# Patient Record
Sex: MALE | Race: BLACK OR AFRICAN AMERICAN
[De-identification: names, ages, dates, MRNs, and addresses within clinical notes are randomized per-mention and may not be internally consistent; named-entity substitution may affect disease eponyms.]

---

## 2018-12-05 ENCOUNTER — HOSPITAL ENCOUNTER (EMERGENCY)
Dept: HOSPITAL 92 - ERS | Age: 33
Discharge: HOME | End: 2018-12-05
Payer: SELF-PAY

## 2018-12-05 DIAGNOSIS — F41.9: ICD-10-CM

## 2018-12-05 DIAGNOSIS — H10.9: Primary | ICD-10-CM

## 2018-12-05 DIAGNOSIS — F32.9: ICD-10-CM

## 2018-12-05 PROCEDURE — 99283 EMERGENCY DEPT VISIT LOW MDM: CPT

## 2018-12-08 ENCOUNTER — HOSPITAL ENCOUNTER (EMERGENCY)
Dept: HOSPITAL 92 - ERS | Age: 33
Discharge: HOME | End: 2018-12-08
Payer: SELF-PAY

## 2018-12-08 DIAGNOSIS — L03.213: ICD-10-CM

## 2018-12-08 DIAGNOSIS — H10.9: Primary | ICD-10-CM

## 2018-12-08 PROCEDURE — 96372 THER/PROPH/DIAG INJ SC/IM: CPT

## 2019-07-07 NOTE — CT
Exam: Postcontrast ORBIT CT



HISTORY: Pain and inability to open the right eye. Sensitivity to light.



FINDINGS:

Visualized brain parenchyma is unremarkable.

Bilateral ocular lenses are normally located. Both globes are intact. Retrobulbar fat is preserved an
d symmetric. Symmetric attenuation of the optic nerves and ocular rectus muscles.

Visualized aerodigestive tract is patent. Limited evaluation due to dental amalgam artifact.

No evidence of a maxillofacial fracture.

Mild mucosal disease involving the right maxillary and left maxillary sinus. Coronal images demonstra
te abnormal soft tissue attenuation of the right ostiomeatal complex.

The osseous margins of the orbits are patent.



IMPRESSION:

1. Symmetric attenuation of the orbits. No evidence of a periorbital abscess.

2. Right maxillary sinus disease with abnormal soft tissue attenuation of the right ostiomeatal compl
ex.



Transcribed Date/Time: 7/7/2019 11:28 PM



Reported By: Sherrie Martinez 

Electronically Signed:  7/8/2019 2:09 PM